# Patient Record
Sex: MALE | Race: WHITE | NOT HISPANIC OR LATINO | Employment: FULL TIME | URBAN - METROPOLITAN AREA
[De-identification: names, ages, dates, MRNs, and addresses within clinical notes are randomized per-mention and may not be internally consistent; named-entity substitution may affect disease eponyms.]

---

## 2018-04-19 ENCOUNTER — HISTORICAL (OUTPATIENT)
Dept: ADMINISTRATIVE | Facility: HOSPITAL | Age: 53
End: 2018-04-19

## 2018-04-19 LAB
ABS NEUT (OLG): 3
ALBUMIN SERPL-MCNC: 4.4 GM/DL (ref 3.4–5)
ALBUMIN/GLOB SERPL: 1.42 {RATIO} (ref 1.5–2.5)
ALP SERPL-CCNC: 55 UNIT/L (ref 38–126)
ALT SERPL-CCNC: 30 UNIT/L (ref 7–52)
APPEARANCE, UA: CLEAR
AST SERPL-CCNC: 27 UNIT/L (ref 15–37)
BACTERIA #/AREA URNS AUTO: NORMAL /HPF
BILIRUB SERPL-MCNC: 0.6 MG/DL (ref 0.2–1)
BILIRUB UR QL STRIP: NEGATIVE MG/DL
BILIRUBIN DIRECT+TOT PNL SERPL-MCNC: 0.1 MG/DL (ref 0–0.5)
BUN SERPL-MCNC: 20 MG/DL (ref 7–18)
CALCIUM SERPL-MCNC: 9 MG/DL (ref 8.5–10)
CHLORIDE SERPL-SCNC: 103 MMOL/L (ref 98–107)
CHOLEST SERPL-MCNC: 194 MG/DL (ref 0–200)
CHOLEST/HDLC SERPL: 4.6 {RATIO}
CO2 SERPL-SCNC: 29 MMOL/L (ref 21–32)
COLOR UR: YELLOW
CREAT SERPL-MCNC: 0.89 MG/DL (ref 0.6–1.3)
CREAT/UREA NIT SERPL: 22.5
ERYTHROCYTE [DISTWIDTH] IN BLOOD BY AUTOMATED COUNT: 13.1 % (ref 11.5–17)
GGT SERPL-CCNC: 29 UNIT/L (ref 5–85)
GLOBULIN SER-MCNC: 3.1 GM/DL (ref 1.2–3)
GLUCOSE (UA): NEGATIVE MG/DL
GLUCOSE SERPL-MCNC: 99 MG/DL (ref 74–106)
HCT VFR BLD AUTO: 43.7 % (ref 42–52)
HDLC SERPL-MCNC: 42 MG/DL (ref 35–60)
HGB BLD-MCNC: 15.2 GM/DL (ref 14–18)
HGB UR QL STRIP: NEGATIVE UNIT/L
KETONES UR QL STRIP: NEGATIVE MG/DL
LDH SERPL-CCNC: 142 UNIT/L (ref 140–271)
LDLC SERPL CALC-MCNC: 115 MG/DL (ref 0–129)
LEUKOCYTE ESTERASE UR QL STRIP: NEGATIVE UNIT/L
LYMPHOCYTES # BLD AUTO: 2 X10(3)/MCL (ref 0.6–3.4)
LYMPHOCYTES NFR BLD AUTO: 35.7 % (ref 13–40)
MCH RBC QN AUTO: 33.6 PG (ref 27–31.2)
MCHC RBC AUTO-ENTMCNC: 35 GM/DL (ref 32–36)
MCV RBC AUTO: 96 FL (ref 80–94)
MONOCYTES # BLD AUTO: 0.5 X10(3)/MCL (ref 0–1.8)
MONOCYTES NFR BLD AUTO: 9.5 % (ref 0.1–24)
NEUTROPHILS NFR BLD AUTO: 54.8 % (ref 47–80)
NITRITE UR QL STRIP.AUTO: NEGATIVE
PH UR STRIP: 6 [PH]
PLATELET # BLD AUTO: 207 X10(3)/MCL (ref 130–400)
PMV BLD AUTO: 8.3 FL
POTASSIUM SERPL-SCNC: 4.5 MMOL/L (ref 3.5–5.1)
PROT SERPL-MCNC: 7.5 GM/DL (ref 6.4–8.2)
PROT UR QL STRIP: NEGATIVE MG/DL
PSA SERPL-MCNC: 4.4 NG/ML (ref 0–3.5)
RBC # BLD AUTO: 4.53 X10(6)/MCL (ref 4.7–6.1)
RBC #/AREA URNS HPF: NORMAL /HPF
SODIUM SERPL-SCNC: 136 MMOL/L (ref 136–145)
SP GR UR STRIP: 1.02
SQUAMOUS EPITHELIAL, UA: NORMAL /LPF
TRIGL SERPL-MCNC: 111 MG/DL (ref 30–150)
UROBILINOGEN UR STRIP-ACNC: 0.2 MG/DL
VLDLC SERPL CALC-MCNC: 22.2 MG/DL
WBC # SPEC AUTO: 5.5 X10(3)/MCL (ref 4.5–11.5)
WBC #/AREA URNS AUTO: NORMAL /[HPF]

## 2018-11-08 ENCOUNTER — HISTORICAL (OUTPATIENT)
Dept: ADMINISTRATIVE | Facility: HOSPITAL | Age: 53
End: 2018-11-08

## 2018-11-08 LAB
ABS NEUT (OLG): 2.3 X10(3)/MCL (ref 2.1–9.2)
ALBUMIN SERPL-MCNC: 4.1 GM/DL (ref 3.4–5)
ALBUMIN/GLOB SERPL: 1.64 {RATIO} (ref 1.5–2.5)
ALP SERPL-CCNC: 50 UNIT/L (ref 38–126)
ALT SERPL-CCNC: 19 UNIT/L (ref 7–52)
AST SERPL-CCNC: 20 UNIT/L (ref 15–37)
BILIRUB SERPL-MCNC: 0.3 MG/DL (ref 0.2–1)
BILIRUBIN DIRECT+TOT PNL SERPL-MCNC: 0 MG/DL (ref 0–0.5)
BILIRUBIN DIRECT+TOT PNL SERPL-MCNC: 0.3 MG/DL
BUN SERPL-MCNC: 15 MG/DL (ref 7–18)
CALCIUM SERPL-MCNC: 8.6 MG/DL (ref 8.5–10)
CHLORIDE SERPL-SCNC: 105 MMOL/L (ref 98–107)
CO2 SERPL-SCNC: 29 MMOL/L (ref 21–32)
CREAT SERPL-MCNC: 0.95 MG/DL (ref 0.6–1.3)
ERYTHROCYTE [DISTWIDTH] IN BLOOD BY AUTOMATED COUNT: 12.6 % (ref 11.5–17)
GLOBULIN SER-MCNC: 2.5 GM/DL (ref 1.2–3)
GLUCOSE SERPL-MCNC: 111 MG/DL (ref 74–106)
HCT VFR BLD AUTO: 40.2 % (ref 42–52)
HGB BLD-MCNC: 13 GM/DL (ref 14–18)
LYMPHOCYTES # BLD AUTO: 2 X10(3)/MCL (ref 0.6–3.4)
LYMPHOCYTES NFR BLD AUTO: 42.6 % (ref 13–40)
MCH RBC QN AUTO: 31.9 PG (ref 27–31.2)
MCHC RBC AUTO-ENTMCNC: 32 GM/DL (ref 32–36)
MCV RBC AUTO: 99 FL (ref 80–94)
MONOCYTES # BLD AUTO: 0.5 X10(3)/MCL (ref 0.1–1.3)
MONOCYTES NFR BLD AUTO: 11.4 % (ref 0.1–24)
NEUTROPHILS NFR BLD AUTO: 46 % (ref 47–80)
PLATELET # BLD AUTO: 209 X10(3)/MCL (ref 130–400)
PMV BLD AUTO: 8.4 FL (ref 9.4–12.4)
POTASSIUM SERPL-SCNC: 4 MMOL/L (ref 3.5–5.1)
PROT SERPL-MCNC: 6.6 GM/DL (ref 6.4–8.2)
PSA SERPL-MCNC: 3.91 NG/ML (ref 0–3.5)
RBC # BLD AUTO: 4.07 X10(6)/MCL (ref 4.7–6.1)
SODIUM SERPL-SCNC: 139 MMOL/L (ref 136–145)
TSH SERPL-ACNC: 1.94 MIU/ML (ref 0.35–4.94)
WBC # SPEC AUTO: 4.8 X10(3)/MCL (ref 4.5–11.5)

## 2018-11-15 ENCOUNTER — HISTORICAL (OUTPATIENT)
Dept: ADMINISTRATIVE | Facility: HOSPITAL | Age: 53
End: 2018-11-15

## 2018-11-15 LAB
PSA SERPL-MCNC: 5.75 NG/ML (ref 0–3.5)
TESTOST SERPL-MCNC: 554 NG/DL (ref 300–1060)

## 2019-05-30 ENCOUNTER — HISTORICAL (OUTPATIENT)
Dept: ADMINISTRATIVE | Facility: HOSPITAL | Age: 54
End: 2019-05-30

## 2019-05-30 LAB
ABS NEUT (OLG): 2.6 X10(3)/MCL (ref 2.1–9.2)
ALBUMIN SERPL-MCNC: 4.2 GM/DL (ref 3.4–5)
ALBUMIN/GLOB SERPL: 1.75 {RATIO} (ref 1.5–2.5)
ALP SERPL-CCNC: 46 UNIT/L (ref 38–126)
ALT SERPL-CCNC: 19 UNIT/L (ref 7–52)
APPEARANCE, UA: CLEAR
AST SERPL-CCNC: 20 UNIT/L (ref 15–37)
BACTERIA #/AREA URNS AUTO: NORMAL /HPF
BILIRUB SERPL-MCNC: 0.6 MG/DL (ref 0.2–1)
BILIRUB UR QL STRIP: NEGATIVE MG/DL
BILIRUBIN DIRECT+TOT PNL SERPL-MCNC: 0.2 MG/DL (ref 0–0.5)
BILIRUBIN DIRECT+TOT PNL SERPL-MCNC: 0.4 MG/DL
BUN SERPL-MCNC: 22 MG/DL (ref 7–18)
CALCIUM SERPL-MCNC: 8.6 MG/DL (ref 8.5–10)
CHLORIDE SERPL-SCNC: 105 MMOL/L (ref 98–107)
CHOLEST SERPL-MCNC: 183 MG/DL (ref 0–200)
CHOLEST/HDLC SERPL: 4.2 {RATIO}
CO2 SERPL-SCNC: 27 MMOL/L (ref 21–32)
COLOR UR: NORMAL
CREAT SERPL-MCNC: 0.88 MG/DL (ref 0.6–1.3)
ERYTHROCYTE [DISTWIDTH] IN BLOOD BY AUTOMATED COUNT: 12.5 % (ref 11.5–17)
EST. AVERAGE GLUCOSE BLD GHB EST-MCNC: 91 MG/DL
GLOBULIN SER-MCNC: 2.4 GM/DL (ref 1.2–3)
GLUCOSE (UA): NEGATIVE MG/DL
GLUCOSE SERPL-MCNC: 112 MG/DL (ref 74–106)
HBA1C MFR BLD: 4.8 % (ref 4.4–6.4)
HCT VFR BLD AUTO: 41.7 % (ref 42–52)
HDLC SERPL-MCNC: 44 MG/DL (ref 35–60)
HGB BLD-MCNC: 14.5 GM/DL (ref 14–18)
HGB UR QL STRIP: NEGATIVE UNIT/L
KETONES UR QL STRIP: NEGATIVE MG/DL
LDLC SERPL CALC-MCNC: 108 MG/DL (ref 0–129)
LEUKOCYTE ESTERASE UR QL STRIP: NEGATIVE UNIT/L
LYMPHOCYTES # BLD AUTO: 1.5 X10(3)/MCL (ref 0.6–3.4)
LYMPHOCYTES NFR BLD AUTO: 33.4 % (ref 13–40)
MCH RBC QN AUTO: 32.4 PG (ref 27–31.2)
MCHC RBC AUTO-ENTMCNC: 35 GM/DL (ref 32–36)
MCV RBC AUTO: 93 FL (ref 80–94)
MONOCYTES # BLD AUTO: 0.5 X10(3)/MCL (ref 0.1–1.3)
MONOCYTES NFR BLD AUTO: 11.6 % (ref 0.1–24)
NEUTROPHILS NFR BLD AUTO: 55 % (ref 47–80)
NITRITE UR QL STRIP.AUTO: NEGATIVE
PH UR STRIP: 5.5 [PH]
PLATELET # BLD AUTO: 190 X10(3)/MCL (ref 130–400)
PMV BLD AUTO: 8.6 FL (ref 9.4–12.4)
POTASSIUM SERPL-SCNC: 4.2 MMOL/L (ref 3.5–5.1)
PROT SERPL-MCNC: 6.6 GM/DL (ref 6.4–8.2)
PROT UR QL STRIP: NEGATIVE MG/DL
PSA SERPL-MCNC: 5.72 NG/ML (ref 0–3.5)
RBC # BLD AUTO: 4.47 X10(6)/MCL (ref 4.7–6.1)
RBC #/AREA URNS HPF: NORMAL /HPF
SODIUM SERPL-SCNC: 138 MMOL/L (ref 136–145)
SP GR UR STRIP: 1.02
SQUAMOUS EPITHELIAL, UA: NORMAL /LPF
TRIGL SERPL-MCNC: 94 MG/DL (ref 30–150)
TSH SERPL-ACNC: 1.32 MIU/ML (ref 0.35–4.94)
UROBILINOGEN UR STRIP-ACNC: 0.2 MG/DL
VLDLC SERPL CALC-MCNC: 18.8 MG/DL
WBC # SPEC AUTO: 4.6 X10(3)/MCL (ref 4.5–11.5)
WBC #/AREA URNS AUTO: NORMAL /[HPF]

## 2020-06-29 ENCOUNTER — HISTORICAL (OUTPATIENT)
Dept: ADMINISTRATIVE | Facility: HOSPITAL | Age: 55
End: 2020-06-29

## 2020-06-29 LAB
ABS NEUT (OLG): 3.1 X10(3)/MCL (ref 2.1–9.2)
ALBUMIN SERPL-MCNC: 4.3 GM/DL (ref 3.4–5)
ALBUMIN/GLOB SERPL: 1.65 {RATIO} (ref 1.5–2.5)
ALP SERPL-CCNC: 46 UNIT/L (ref 38–126)
ALT SERPL-CCNC: 18 UNIT/L (ref 7–52)
APPEARANCE, UA: CLEAR
AST SERPL-CCNC: 20 UNIT/L (ref 15–37)
BACTERIA #/AREA URNS AUTO: NORMAL /HPF
BILIRUB SERPL-MCNC: 0.6 MG/DL (ref 0.2–1)
BILIRUB UR QL STRIP: NEGATIVE MG/DL
BILIRUBIN DIRECT+TOT PNL SERPL-MCNC: 0.1 MG/DL (ref 0–0.5)
BILIRUBIN DIRECT+TOT PNL SERPL-MCNC: 0.5 MG/DL
BUN SERPL-MCNC: 18 MG/DL (ref 7–18)
CALCIUM SERPL-MCNC: 9.3 MG/DL (ref 8.5–10)
CHLORIDE SERPL-SCNC: 103 MMOL/L (ref 98–107)
CHOLEST SERPL-MCNC: 178 MG/DL (ref 0–200)
CHOLEST/HDLC SERPL: 4 {RATIO}
CO2 SERPL-SCNC: 30 MMOL/L (ref 21–32)
COLOR UR: YELLOW
CREAT SERPL-MCNC: 0.92 MG/DL (ref 0.6–1.3)
ERYTHROCYTE [DISTWIDTH] IN BLOOD BY AUTOMATED COUNT: 12.8 % (ref 11.5–17)
EST. AVERAGE GLUCOSE BLD GHB EST-MCNC: 100 MG/DL
GLOBULIN SER-MCNC: 2.6 GM/DL (ref 1.2–3)
GLUCOSE (UA): NEGATIVE MG/DL
GLUCOSE SERPL-MCNC: 98 MG/DL (ref 74–106)
HBA1C MFR BLD: 5.1 % (ref 4.4–6.4)
HCT VFR BLD AUTO: 42.5 % (ref 42–52)
HDLC SERPL-MCNC: 44 MG/DL (ref 35–60)
HGB BLD-MCNC: 14.3 GM/DL (ref 14–18)
HGB UR QL STRIP: NEGATIVE UNIT/L
KETONES UR QL STRIP: NEGATIVE MG/DL
LDLC SERPL CALC-MCNC: 95 MG/DL (ref 0–129)
LEUKOCYTE ESTERASE UR QL STRIP: NEGATIVE UNIT/L
LYMPHOCYTES # BLD AUTO: 2.1 X10(3)/MCL (ref 0.6–3.4)
LYMPHOCYTES NFR BLD AUTO: 37.9 % (ref 13–40)
MCH RBC QN AUTO: 31.2 PG (ref 27–31.2)
MCHC RBC AUTO-ENTMCNC: 34 GM/DL (ref 32–36)
MCV RBC AUTO: 93 FL (ref 80–94)
MONOCYTES # BLD AUTO: 0.4 X10(3)/MCL (ref 0.1–1.3)
MONOCYTES NFR BLD AUTO: 7.5 % (ref 0.1–24)
NEUTROPHILS NFR BLD AUTO: 54.6 % (ref 47–80)
NITRITE UR QL STRIP.AUTO: NEGATIVE
PH UR STRIP: 7 [PH]
PLATELET # BLD AUTO: 228 X10(3)/MCL (ref 130–400)
PMV BLD AUTO: 8.5 FL (ref 9.4–12.4)
POTASSIUM SERPL-SCNC: 4.3 MMOL/L (ref 3.5–5.1)
PROT SERPL-MCNC: 6.9 GM/DL (ref 6.4–8.2)
PROT UR QL STRIP: NEGATIVE MG/DL
PSA SERPL-MCNC: 5.71 NG/ML (ref 0–3.5)
RBC # BLD AUTO: 4.59 X10(6)/MCL (ref 4.7–6.1)
RBC #/AREA URNS HPF: NORMAL /HPF
SODIUM SERPL-SCNC: 140 MMOL/L (ref 136–145)
SP GR UR STRIP: 1.02
SQUAMOUS EPITHELIAL, UA: NORMAL /LPF
TRIGL SERPL-MCNC: 205 MG/DL (ref 30–150)
TSH SERPL-ACNC: 2.3 MIU/ML (ref 0.35–4.94)
UROBILINOGEN UR STRIP-ACNC: 0.2 MG/DL
VLDLC SERPL CALC-MCNC: 41 MG/DL
WBC # SPEC AUTO: 5.6 X10(3)/MCL (ref 4.5–11.5)
WBC #/AREA URNS AUTO: NORMAL /[HPF]

## 2021-01-05 ENCOUNTER — HISTORICAL (OUTPATIENT)
Dept: ADMINISTRATIVE | Facility: HOSPITAL | Age: 56
End: 2021-01-05

## 2021-01-05 LAB
ALBUMIN SERPL-MCNC: 4.4 GM/DL (ref 3.4–5)
ALBUMIN/GLOB SERPL: 1.52 {RATIO} (ref 1.5–2.5)
ALP SERPL-CCNC: 51 UNIT/L (ref 38–126)
ALT SERPL-CCNC: 31 UNIT/L (ref 7–52)
AST SERPL-CCNC: 27 UNIT/L (ref 15–37)
BILIRUB SERPL-MCNC: 0.6 MG/DL (ref 0.2–1)
BILIRUBIN DIRECT+TOT PNL SERPL-MCNC: 0.2 MG/DL (ref 0–0.5)
BILIRUBIN DIRECT+TOT PNL SERPL-MCNC: 0.4 MG/DL
BUN SERPL-MCNC: 19 MG/DL (ref 7–18)
CALCIUM SERPL-MCNC: 9.2 MG/DL (ref 8.5–10.1)
CHLORIDE SERPL-SCNC: 102 MMOL/L (ref 98–107)
CHOLEST SERPL-MCNC: 182 MG/DL (ref 0–200)
CHOLEST/HDLC SERPL: 4.1 {RATIO}
CO2 SERPL-SCNC: 26 MMOL/L (ref 21–32)
CREAT SERPL-MCNC: 0.83 MG/DL (ref 0.6–1.3)
GLOBULIN SER-MCNC: 2.9 GM/DL (ref 1.2–3)
GLUCOSE SERPL-MCNC: 102 MG/DL (ref 74–106)
HDLC SERPL-MCNC: 44 MG/DL (ref 35–60)
LDLC SERPL CALC-MCNC: 104 MG/DL (ref 0–129)
POTASSIUM SERPL-SCNC: 4 MMOL/L (ref 3.5–5.1)
PROT SERPL-MCNC: 7.3 GM/DL (ref 6.4–8.2)
SODIUM SERPL-SCNC: 137 MMOL/L (ref 136–145)
TRIGL SERPL-MCNC: 174 MG/DL (ref 30–150)
VLDLC SERPL CALC-MCNC: 34.8 MG/DL

## 2021-01-06 ENCOUNTER — HISTORICAL (OUTPATIENT)
Dept: ADMINISTRATIVE | Facility: HOSPITAL | Age: 56
End: 2021-01-06

## 2021-01-06 LAB
FLUAV AG NPH QL IA: NEGATIVE
FLUBV AG NPH QL IA: NEGATIVE

## 2021-07-09 ENCOUNTER — HISTORICAL (OUTPATIENT)
Dept: ADMINISTRATIVE | Facility: HOSPITAL | Age: 56
End: 2021-07-09

## 2021-07-09 LAB
ABS NEUT (OLG): 2.5 X10(3)/MCL (ref 2.1–9.2)
ALBUMIN SERPL-MCNC: 4.4 GM/DL (ref 3.4–5)
ALBUMIN/GLOB SERPL: 1.57 {RATIO} (ref 1.5–2.5)
ALP SERPL-CCNC: 52 UNIT/L (ref 38–126)
ALT SERPL-CCNC: 20 UNIT/L (ref 7–52)
APPEARANCE, UA: CLEAR
AST SERPL-CCNC: 20 UNIT/L (ref 15–37)
BACTERIA #/AREA URNS AUTO: NORMAL /HPF
BILIRUB SERPL-MCNC: 0.5 MG/DL (ref 0.2–1)
BILIRUB UR QL STRIP: NEGATIVE MG/DL
BILIRUBIN DIRECT+TOT PNL SERPL-MCNC: 0.1 MG/DL (ref 0–0.5)
BILIRUBIN DIRECT+TOT PNL SERPL-MCNC: 0.4 MG/DL
BUN SERPL-MCNC: 18 MG/DL (ref 7–18)
CALCIUM SERPL-MCNC: 8.9 MG/DL (ref 8.5–10.1)
CHLORIDE SERPL-SCNC: 105 MMOL/L (ref 98–107)
CHOLEST SERPL-MCNC: 175 MG/DL (ref 0–200)
CHOLEST/HDLC SERPL: 3.8 {RATIO}
CO2 SERPL-SCNC: 27 MMOL/L (ref 21–32)
COLOR UR: YELLOW
CREAT SERPL-MCNC: 0.87 MG/DL (ref 0.6–1.3)
ERYTHROCYTE [DISTWIDTH] IN BLOOD BY AUTOMATED COUNT: 12.3 % (ref 11.5–17)
EST. AVERAGE GLUCOSE BLD GHB EST-MCNC: 88 MG/DL
GLOBULIN SER-MCNC: 2.8 GM/DL (ref 1.2–3)
GLUCOSE (UA): NEGATIVE MG/DL
GLUCOSE SERPL-MCNC: 103 MG/DL (ref 74–106)
HBA1C MFR BLD: 4.7 % (ref 4.4–6.4)
HCT VFR BLD AUTO: 41.3 % (ref 42–52)
HDLC SERPL-MCNC: 46 MG/DL (ref 35–60)
HGB BLD-MCNC: 13.6 GM/DL (ref 14–18)
HGB UR QL STRIP: NEGATIVE UNIT/L
KETONES UR QL STRIP: NEGATIVE MG/DL
LDLC SERPL CALC-MCNC: 107 MG/DL (ref 0–129)
LEUKOCYTE ESTERASE UR QL STRIP: NEGATIVE UNIT/L
LYMPHOCYTES # BLD AUTO: 1.7 X10(3)/MCL (ref 0.6–3.4)
LYMPHOCYTES NFR BLD AUTO: 37.1 % (ref 13–40)
MCH RBC QN AUTO: 31.1 PG (ref 27–31.2)
MCHC RBC AUTO-ENTMCNC: 33 GM/DL (ref 32–36)
MCV RBC AUTO: 94 FL (ref 80–94)
MONOCYTES # BLD AUTO: 0.4 X10(3)/MCL (ref 0.1–1.3)
MONOCYTES NFR BLD AUTO: 8.3 % (ref 0.1–24)
NEUTROPHILS NFR BLD AUTO: 54.6 % (ref 47–80)
NITRITE UR QL STRIP.AUTO: NEGATIVE
PH UR STRIP: 6 [PH]
PLATELET # BLD AUTO: 222 X10(3)/MCL (ref 130–400)
PMV BLD AUTO: 9.2 FL (ref 9.4–12.4)
POTASSIUM SERPL-SCNC: 4.3 MMOL/L (ref 3.5–5.1)
PROT SERPL-MCNC: 7.2 GM/DL (ref 6.4–8.2)
PROT UR QL STRIP: NEGATIVE MG/DL
PSA SERPL-MCNC: 7.2 NG/ML (ref 0–3.5)
RBC # BLD AUTO: 4.38 X10(6)/MCL (ref 4.7–6.1)
RBC #/AREA URNS HPF: NORMAL /HPF
SODIUM SERPL-SCNC: 140 MMOL/L (ref 136–145)
SP GR UR STRIP: 1.02
SQUAMOUS EPITHELIAL, UA: NORMAL /LPF
TRIGL SERPL-MCNC: 93 MG/DL (ref 30–150)
UROBILINOGEN UR STRIP-ACNC: 0.2 MG/DL
VLDLC SERPL CALC-MCNC: 18.6 MG/DL
WBC # SPEC AUTO: 4.6 X10(3)/MCL (ref 4.5–11.5)
WBC #/AREA URNS AUTO: NORMAL /[HPF]

## 2022-01-11 ENCOUNTER — HISTORICAL (OUTPATIENT)
Dept: ADMINISTRATIVE | Facility: HOSPITAL | Age: 57
End: 2022-01-11

## 2022-01-11 LAB
ABS NEUT (OLG): 3 X10(3)/MCL (ref 2.1–9.2)
ALBUMIN SERPL-MCNC: 4.5 GM/DL (ref 3.4–5)
ALBUMIN/GLOB SERPL: 1.61 {RATIO} (ref 1.5–2.5)
ALP SERPL-CCNC: 49 UNIT/L (ref 38–126)
ALT SERPL-CCNC: 26 UNIT/L (ref 7–52)
AST SERPL-CCNC: 23 UNIT/L (ref 15–37)
BILIRUB SERPL-MCNC: 0.4 MG/DL (ref 0.2–1)
BILIRUBIN DIRECT+TOT PNL SERPL-MCNC: 0.1 MG/DL (ref 0–0.5)
BILIRUBIN DIRECT+TOT PNL SERPL-MCNC: 0.3 MG/DL
BUN SERPL-MCNC: 18 MG/DL (ref 7–18)
CALCIUM SERPL-MCNC: 9.3 MG/DL (ref 8.5–10.1)
CHLORIDE SERPL-SCNC: 103 MMOL/L (ref 98–107)
CHOLEST SERPL-MCNC: 188 MG/DL (ref 0–200)
CHOLEST/HDLC SERPL: 4.1 {RATIO}
CO2 SERPL-SCNC: 28 MMOL/L (ref 21–32)
CREAT SERPL-MCNC: 0.97 MG/DL (ref 0.6–1.3)
ERYTHROCYTE [DISTWIDTH] IN BLOOD BY AUTOMATED COUNT: 12.3 % (ref 11.5–17)
GLOBULIN SER-MCNC: 2.8 GM/DL (ref 1.2–3)
GLUCOSE SERPL-MCNC: 104 MG/DL (ref 74–106)
HCT VFR BLD AUTO: 41.9 % (ref 42–52)
HDLC SERPL-MCNC: 46 MG/DL (ref 35–60)
HGB BLD-MCNC: 14.3 GM/DL (ref 14–18)
LDLC SERPL CALC-MCNC: 114 MG/DL (ref 0–129)
LYMPHOCYTES # BLD AUTO: 2 X10(3)/MCL (ref 0.6–3.4)
LYMPHOCYTES NFR BLD AUTO: 36 % (ref 13–40)
MCH RBC QN AUTO: 31.4 PG (ref 27–31.2)
MCHC RBC AUTO-ENTMCNC: 34 GM/DL (ref 32–36)
MCV RBC AUTO: 92 FL (ref 80–94)
MONOCYTES # BLD AUTO: 0.6 X10(3)/MCL (ref 0.1–1.3)
MONOCYTES NFR BLD AUTO: 10.2 % (ref 0.1–24)
NEUTROPHILS NFR BLD AUTO: 53.8 % (ref 47–80)
PLATELET # BLD AUTO: 224 X10(3)/MCL (ref 130–400)
PMV BLD AUTO: 9.1 FL (ref 9.4–12.4)
POTASSIUM SERPL-SCNC: 4.5 MMOL/L (ref 3.5–5.1)
PROT SERPL-MCNC: 7.3 GM/DL (ref 6.4–8.2)
RBC # BLD AUTO: 4.55 X10(6)/MCL (ref 4.7–6.1)
SODIUM SERPL-SCNC: 138 MMOL/L (ref 136–145)
TRIGL SERPL-MCNC: 183 MG/DL (ref 30–150)
VLDLC SERPL CALC-MCNC: 36.6 MG/DL
WBC # SPEC AUTO: 5.6 X10(3)/MCL (ref 4.5–11.5)

## 2022-01-12 ENCOUNTER — HISTORICAL (OUTPATIENT)
Dept: ADMINISTRATIVE | Facility: HOSPITAL | Age: 57
End: 2022-01-12

## 2022-01-12 LAB
DEPRECATED CALCIDIOL+CALCIFEROL SERPL-MC: 39 NG/ML (ref 30–80)
EST. AVERAGE GLUCOSE BLD GHB EST-MCNC: 91 MG/DL
HBA1C MFR BLD: 4.8 % (ref 4.4–6.4)
TESTOST SERPL-MCNC: 680 NG/DL (ref 300–1060)
TSH SERPL-ACNC: 2.23 MIU/ML (ref 0.35–4.94)

## 2022-04-09 ENCOUNTER — HISTORICAL (OUTPATIENT)
Dept: ADMINISTRATIVE | Facility: HOSPITAL | Age: 57
End: 2022-04-09

## 2022-04-29 VITALS
HEIGHT: 70 IN | WEIGHT: 209.88 LBS | DIASTOLIC BLOOD PRESSURE: 88 MMHG | BODY MASS INDEX: 30.05 KG/M2 | SYSTOLIC BLOOD PRESSURE: 139 MMHG

## 2022-05-02 NOTE — HISTORICAL [facility]
This is a historical note converted from [place]. Formatting and pictures may have been removed.  Please reference [place] for original formatting and attached multimedia. Chief Complaint  6 MTH RECHECK  History of Present Illness  Patient presents for a 6-month?follow-up?visit.?Labs that were done on?1/11/2022?reviewed with patient at time of office visit.  Denies change in?social history. No exercise.?No nicotine.  Declines Flu vaccine. Had COVID infection in 2021, declines vaccine.  Review of Systems  General:??????????????? Fatigue.  HEENT:?????????????????? Denies vision changes or eye pain.? No sore throat, ear pain, sinus pressure or discharge.  Cardiovascular:??? Denies chest pain, palpitations, dyspnea on exertion, orthopnea.  Respiratory:???????? No cough, wheezing, shortness of breath, or sputum.  GI:????????????????????????? Denies nausea, emesis, constipation, diarrhea, melena, hematochezia or abdominal pain  :??????????????????????? Decreased libido, desires testosterone level.? No frequency, urgency, hematuria, discharge, or incontinence  MS:?????????????????????? Denies myalgias, arthralgias, joint effusion, edema, or weakness  Neuro:????????????????? No headaches, numbness in extremities, tingling, dizziness, or weakness  Psych:?????????????????? Mood is well controlled.?Denies? suicidal or homicidal ideations, or irritability  Endo:??????????????????? Denies polyuria, polydipsia, polyphagia  Heme:????????????????? No abnormal bleeding or bruising. No lymph node enlargement or pain.  Physical Exam  Vitals & Measurements  HR:?68(Peripheral)? BP:?139/88?  HT:?177.00?cm? HT:?177?cm? WT:?95.200?kg? WT:?95.2?kg? BMI:?30.39?  General :?????Well-developed, mildly obese white male in no apparent distress, alert and oriented ?4  HEENT:????? TMs and EACs within normal limits,?nasal mucosa within normal limits?with no discharge,?oropharynx clear  Integument :????? Skin is intact with no erythema.? No pustules  or vesicles.? No rash or scale. No Lymphadenopathy.? No urticaria.? No abnormal nevi  Neck :?????Supple, no lymphadenopathy, no thyromegaly, no bruits, no jugular venous distention  Cardiovascular :?????? Regular rhythm and rate, no murmurs, radial and dorsal pedal pulses 2+ bilaterally  Respiratory :??????Lungs clear to auscultation bilaterally, no wheezes, no crackles, no rhonchi.? Good air movement  Abdomen :?????? ?NABS, soft, nontender, no hepatosplenomegaly, no masses, no guarding or rebound????????????????????????  Ext:??????? No muscle tenderness, joints WNL, FROM, negative SLR, no?CCE  Neuro :???????? grossly intact  Heme :?????? No bruising or petechiae?  Back:??????? no CVAT  Assessment/Plan  1.?Depression?F32.9  ?Mood is well controlled. Refilled?fluoxetine 40 mg for 6 months.  Ordered:  Clinic Follow up, *Est. 07/12/22 3:00:00 CDT, CPX in 6 months, CPX in 6 months, Order for future visit, Depression  Anemia  GERD (gastroesophageal reflux disease)  Hypertriglyceridemia, HLink AFP  Office/Outpatient Visit Level 5 Established 51743 PC, Depression  Anemia  GERD (gastroesophageal reflux disease)  Hypertriglyceridemia  Elevated PSA  Decreased libido  Fatigue  Obesity  Erectile dysfunction  Elevated glucose, HLINK AMB - AFP, 01/12/22 8:29:00 CST  ?  2.?Anemia?D64.9  ?His anemia is currently resolved.  Ordered:  Clinic Follow up, *Est. 07/12/22 3:00:00 CDT, CPX in 6 months, CPX in 6 months, Order for future visit, Depression  Anemia  GERD (gastroesophageal reflux disease)  Hypertriglyceridemia, HLink AFP  Office/Outpatient Visit Level 5 Established 68776 PC, Depression  Anemia  GERD (gastroesophageal reflux disease)  Hypertriglyceridemia  Elevated PSA  Decreased libido  Fatigue  Obesity  Erectile dysfunction  Elevated glucose, [facility]LINK AMB - AFP, 01/12/22 8:29:00 CST  ?  3.?GERD (gastroesophageal reflux disease)?K21.9  ?GERD symptoms are well controlled with omeprazole 40 mg, refill for 6  months.  Ordered:  Clinic Follow up, *Est. 07/12/22 3:00:00 CDT, CPX in 6 months, CPX in 6 months, Order for future visit, Depression  Anemia  GERD (gastroesophageal reflux disease)  Hypertriglyceridemia, HLink AFP  Office/Outpatient Visit Level 5 Established 88813 PC, Depression  Anemia  GERD (gastroesophageal reflux disease)  Hypertriglyceridemia  Elevated PSA  Decreased libido  Fatigue  Obesity  Erectile dysfunction  Elevated glucose, HLINK AMB - AFP, 01/12/22 8:29:00 CST  ?  4.?Hypertriglyceridemia?E78.1  ?His triglycerides are mildly elevated?at this time. He was encouraged to?decrease carbohydrate intake,?increase omega-3 supplementation,?and increase exercise.  Ordered:  Clinic Follow up, *Est. 07/12/22 3:00:00 CDT, CPX in 6 months, CPX in 6 months, Order for future visit, Depression  Anemia  GERD (gastroesophageal reflux disease)  Hypertriglyceridemia, HLink AFP  Office/Outpatient Visit Level 5 Established 51025 PC, Depression  Anemia  GERD (gastroesophageal reflux disease)  Hypertriglyceridemia  Elevated PSA  Decreased libido  Fatigue  Obesity  Erectile dysfunction  Elevated glucose, HLINK AMB - AFP, 01/12/22 8:29:00 CST  ?  5.?Elevated PSA?R97.20  ?We will check PSA today and forward to his?urologist, Dr. Morales.  Ordered:  Lab Collection Request, 01/12/22 8:20:00 CST, HLINK AMB - AFP, 01/12/22 8:20:00 CST, Elevated PSA  Office/Outpatient Visit Level 5 Established 58996 PC, Depression  Anemia  GERD (gastroesophageal reflux disease)  Hypertriglyceridemia  Elevated PSA  Decreased libido  Fatigue  Obesity  Erectile dysfunction  Elevated glucose, HLINK AMB - AFP, 01/12/22 8:29:00 CST  Prostate Specific Antigen, Routine collect, 01/12/22 8:20:00 CST, Blood, Order for future visit, Stop date 01/12/22 8:20:00 CST, Lab Collect, Elevated PSA, 01/12/22 8:20:00 CST  Prostate Specific Antigen, Routine collect, 01/11/22 9:15:00 CST, Blood, Stop date 01/11/22 9:15:00 CST, Lab Collect,  Elevated PSA, 01/11/22 9:15:00 CST  ?  6.?Decreased libido?R68.82  ?Per request we will check a testosterone level.  Ordered:  Lab Collection Request, 01/12/22 8:22:00 CST, HLINK AMB - AFP, 01/12/22 8:22:00 CST, Decreased libido  Office/Outpatient Visit Level 5 Established 93768 PC, Depression  Anemia  GERD (gastroesophageal reflux disease)  Hypertriglyceridemia  Elevated PSA  Decreased libido  Fatigue  Obesity  Erectile dysfunction  Elevated glucose, HLINK AMB - AFP, 01/12/22 8:29:00 CST  Testosterone Level Total, Routine collect, 01/12/22 8:22:00 CST, Blood, Order for future visit, Stop date 01/12/22 8:22:00 CST, Lab Collect, Decreased libido, 01/12/22 8:22:00 CST  ?  7.?Fatigue?R53.83  ?We will add TSH and?vitamin D level?to rule out?their contribution to fatigue.  Ordered:  Hemoglobin A1c, Routine collect, 01/12/22 8:24:00 CST, Blood, Order for future visit, Stop date 01/12/22 8:24:00 CST, Lab Collect, Elevated glucose  Fatigue, 01/12/22 8:24:00 CST  Office/Outpatient Visit Level 5 Established 47229 PC, Depression  Anemia  GERD (gastroesophageal reflux disease)  Hypertriglyceridemia  Elevated PSA  Decreased libido  Fatigue  Obesity  Erectile dysfunction  Elevated glucose, HLINK AMB - AFP, 01/12/22 8:29:00 CST  Thyroid Stimulating Hormone, Routine collect, 01/12/22 8:23:00 CST, Blood, Order for future visit, Stop date 01/12/22 8:23:00 CST, Lab Collect, Fatigue, 01/12/22 8:23:00 CST  Vitamin D, 25-Hydroxy Level, Routine collect, 01/12/22 8:23:00 CST, Blood, Order for future visit, Stop date 01/12/22 8:23:00 CST, Lab Collect, Obesity  Fatigue, 01/12/22 8:23:00 CST  ?  8.?Obesity?E66.9  ?Discussed long term adverse effects of obesity. Encouraged to lose weight.?Encouraged to begin regular exercise.  Ordered:  Office/Outpatient Visit Level 5 Established 26636 PC, Depression  Anemia  GERD (gastroesophageal reflux disease)  Hypertriglyceridemia  Elevated PSA  Decreased libido  Fatigue   Obesity  Erectile dysfunction  Elevated glucose, HLINK AMB - AFP, 01/12/22 8:29:00 CST  Vitamin D, 25-Hydroxy Level, Routine collect, 01/12/22 8:23:00 CST, Blood, Order for future visit, Stop date 01/12/22 8:23:00 CST, Lab Collect, Obesity  Fatigue, 01/12/22 8:23:00 CST  ?  9.?Erectile dysfunction?N52.9  ?Sildenafil 100 mg has been effective?most of the time, refill.  Ordered:  Office/Outpatient Visit Level 5 Established 82772 PC, Depression  Anemia  GERD (gastroesophageal reflux disease)  Hypertriglyceridemia  Elevated PSA  Decreased libido  Fatigue  Obesity  Erectile dysfunction  Elevated glucose, HLINK AMB - AFP, 01/12/22 8:29:00 CST  ?  10.?Elevated glucose?R73.09  ?We will check an A1c. See #4.  Ordered:  Hemoglobin A1c, Routine collect, 01/12/22 8:24:00 CST, Blood, Order for future visit, Stop date 01/12/22 8:24:00 CST, Lab Collect, Elevated glucose  Fatigue, 01/12/22 8:24:00 CST  Office/Outpatient Visit Level 5 Established 32826 PC, Depression  Anemia  GERD (gastroesophageal reflux disease)  Hypertriglyceridemia  Elevated PSA  Decreased libido  Fatigue  Obesity  Erectile dysfunction  Elevated glucose, HLINK AMB - AFP, 01/12/22 8:29:00 CST  ?  Orders:  FLUoxetine, 40 mg = 1 cap(s), Oral, Daily, # 90 cap(s), 1 Refill(s), Pharmacy: [id number], 177, cm, Height/Length Dosing, 01/12/22 7:59:00 CST, 95.2, kg, Weight Dosing, 01/12/22 7:59:00 CST  omeprazole, See Instructions, TAKE 1 CAPSULE BY MOUTH ONCE DAILY BEFORE A MEAL, # 90 cap(s), 1 Refill(s), Pharmacy: [facility], 177, cm, Height/Length Dosing, 01/12/22 7:59:00 CST, 95.2, kg, Weight Dosing, 01/12/22 7:59:00 CST  sildenafil, 100 mg = 1 tab(s), Oral, As Directed, # 6 tab(s), 99 Refill(s), Pharmacy: [facility], 177, cm, Height/Length Dosing, 01/12/22 7:59:00 CST, 95.2, kg, Weight Dosing, 01/12/22 7:59:00 CST  Referrals  Clinic Follow up, *Est. 07/12/22 3:00:00 CDT, CPX in 6 months, CPX in 6 months, Order for  future visit, Depression  Anemia  GERD (gastroesophageal reflux disease)  Hypertriglyceridemia, HLink AFP  Clinic Follow up, Order for future visit   Problem List/Past Medical History  Ongoing  Activity tolerance  Anemia  Depression  Elevated glucose  Elevated PSA  Erectile dysfunction  GERD (gastroesophageal reflux disease)  Hypertriglyceridemia  Obesity  Rectal abscess  Wellness examination  Historical  No qualifying data  Procedure/Surgical History  Drainage of Rectum, Open Approach (12/14/2016)  Exam Under Anesthesia Rectal (None) (12/14/2016)  Incision & Drainage Rectal Abscess (None) (12/14/2016)  Incision and drainage of ischiorectal and/or perirectal abscess (separate procedure) (12/14/2016)  colonoscopy (2009)   Medications  FLUoxetine 40 mg oral capsule, 40 mg= 1 cap(s), Oral, Daily, 1 refills  Multi Vitamin+  omeprazole 40 mg oral DR capsule, See Instructions, 1 refills  sildenafil 100 mg oral tablet, 100 mg= 1 tab(s), Oral, As Directed, 99 refills  Allergies  No Known Allergies  Social History  Abuse/Neglect  No, 01/12/2022  No, 07/12/2021  No, 01/06/2021  No, 07/01/2020  Alcohol  Current, Beer, 1-2 times per week, 12/13/2016  Employment/School  Employed, Work/School description: [employer] 06/06/2019  Tobacco  Never (less than 100 in lifetime), No, 01/12/2022  Never (less than 100 in lifetime), No, 07/12/2021  Never (less than 100 in lifetime), No, 01/06/2021  Never (less than 100 in lifetime), No, 01/06/2021  Never (less than 100 in lifetime), No, 07/01/2020  Never (less than 100 in lifetime), N/A, 06/06/2019  Never smoker, N/A, 11/15/2018  Family History  Pancreatic cancer.....: Mother.  Primary malignant neoplasm of prostate: Father.  Health Maintenance  Health Maintenance  ???Pending?(in the next year)  ??? ??OverDue  ??? ? ? ?Influenza Vaccine due??10/01/21??and every 1??day(s)  ??? ??Due?  ??? ? ? ?Alcohol Misuse Screening due??01/02/22??and every 1??year(s)  ??? ? ? ?ADL  Screening due??01/12/22??and every 1??year(s)  ??? ? ? ?Aspirin Therapy for CVD Prevention due??01/12/22??and every 1??year(s)  ??? ? ? ?Colorectal Screening due??01/12/22??Unknown Frequency  ??? ? ? ?Tetanus Vaccine due??01/12/22??and every 10??year(s)  ??? ? ? ?Zoster Vaccine due??01/12/22??Unknown Frequency  ??? ??Due In Future?  ??? ? ? ?Obesity Screening not due until??01/01/23??and every 1??year(s)  ???Satisfied?(in the past 1 year)  ??? ??Satisfied?  ??? ? ? ?Blood Pressure Screening on??01/12/22.??Satisfied by Jessie Le CMA S.  ??? ? ? ?Body Mass Index Check on??01/12/22.??Satisfied by Jessie Le CMA S.  ??? ? ? ?Diabetes Screening on??01/11/22.??Satisfied by Faisal Valladares  ??? ? ? ?Influenza Vaccine on??01/12/22.??Satisfied by Jessie Le CMA S.  ??? ? ? ?Lipid Screening on??01/11/22.??Satisfied by Faisal Valladares  ??? ? ? ?Obesity Screening on??01/12/22.??Satisfied by Jessie Le CMA S.  ?  Lab Results  Test Name Test Result Date/Time   Glucose Lvl 104.0 mg/dL 01/11/2022 09:15 CST   BUN 18.0 mg/dL 01/11/2022 09:15 CST   Creatinine 0.97 mg/dL 01/11/2022 09:15 CST   AST 23.0 unit/L 01/11/2022 09:15 CST   ALT 26.0 unit/L 01/11/2022 09:15 CST   Chol 188.0 mg/dL 01/11/2022 09:15 CST   HDL 46.0 mg/dL 01/11/2022 09:15 CST   Trig 183.0 mg/dL (High) 01/11/2022 09:15 CST   .0 mg/dL 01/11/2022 09:15 CST   Chol/HDL 4.1 01/11/2022 09:15 CST   Hgb 14.3 gm/dL 01/11/2022 09:15 CST   Hct 41.9 % (Low) 01/11/2022 09:15 CST

## 2022-05-02 NOTE — HISTORICAL [facility] CERNER
This is a historical note converted from [place]. Formatting and pictures may have been removed.  Please reference [place] for original formatting and attached multimedia. Chief Complaint  6 MO F/U PT DECLINES FLU VACC  History of Present Illness  Patient here for six-month follow-up.? Denies any side effects to current medications.? Tolerating current meds and?feels that they are effective.? Denies change in social or family history.? Saw [facility] in 2017, bx were normal.  Review of Systems  General:?Omeprazole caps working well for him. Decreased libido .  Donated blood on 11-7-8 for blood drive.  HEENT:?????????????????? Denies vision changes or eye pain.? No sore throat, ear pain, sinus pressure or discharge.  Cardiovascular:??? Denies chest pain, palpitations, dyspnea on exertion, orthopnea.  Respiratory:???????? No cough, wheezing, shortness of breath, or sputum.  GI:????????????????????????? Denies nausea, emesis, constipation, diarrhea, melena, hematochezia or abdominal pain  :??????????????????????? No frequency, urgency, hematuria, discharge, or incontinence  MS:?????????????????????? Denies myalgias, arthralgias, joint effusion, edema, or weakness  Neuro:????????????????? No headaches, numbness in extremities, tingling, dizziness, or weakness  Psych:?????????????????? Denies anxiety, depression, suicidal or homicidal ideations, or irritability  Endo:??????????????????? Denies polyuria, polydipsia, polyphagia  Heme:????????????????? No abnormal bleeding or bruising. No lymph node enlargement or pain.  Physical Exam  Vitals & Measurements  HR:?68(Peripheral)? BP:?128/74?  HT:?177?cm? WT:?92.7?kg?  General :?????Well-developed and? nourished, no apparent distress, alert and oriented ?4  HEENT:????? TMs and EACs within normal limits,?nasal mucosa within normal limits?with no discharge,?oropharynx clear  Integument :????? Skin is intact with no erythema.? No pustules or vesicles.? No rash or scale. No  Lymphadenopathy.? No urticaria.? No abnormal nevi  Neck :?????Supple, no lymphadenopathy, no thyromegaly, no bruits, no jugular venous distention  Cardiovascular :?????? Regular rhythm and rate, no murmurs, radial and dorsal pedal pulses 2+ bilaterally  Respiratory :??????Lungs clear to auscultation bilaterally, no wheezes, no crackles, no rhonchi.? Good air movement  Abdomen :?????? ?NABS, soft, nontender, no hepatosplenomegaly, no masses, no guarding or rebound????????????????????????  MS :??????? No muscle tenderness, joints WNL, FROM, negative SLR, no?CCE  Neuro :? ?????? CN II-XII intact, reflexes 2+ throughout, no motor sensory deficits, negative?cerebellar? tests  Heme :?????? No bruising or petechiae?  Back:??????? no CVAT  Assessment/Plan  1.?Depression?F32.9  ?Controlled on current medication, fluoxetine refilled. ?See #4.  Ordered:  Office/Outpatient Visit Level 4 Established 01961 PC, Depression  Elevated PSA  Acid reflux  Decreased libido, HLINK AMB - AFP, 11/15/18 11:20:00 CST  ?  2.?Elevated PSA?R97.20  ?Level has improved since April. ?Workup in 2017 was benign  Ordered:  Office/Outpatient Visit Level 4 Established 36196 PC, Depression  Elevated PSA  Acid reflux  Decreased libido, HLINK AMB - AFP, 11/15/18 11:20:00 CST  ?  3.?Acid reflux?K21.9  ?Refilled omeprazole for 6 months  Ordered:  Office/Outpatient Visit Level 4 Established 47040 PC, Depression  Elevated PSA  Acid reflux  Decreased libido, HLINK AMB - AFP, 11/15/18 11:20:00 CST  ?  4.?Decreased libido?R68.82  ?If test level is normal, consider changing fluoxetine. ?If his?testosterone level is low?he would prefer to injections versus something oral like Clomid.  Ordered:  Lab Collection Request, 11/15/18 11:16:00 CST, HLINK AMB - AFP, 11/15/18 11:16:00 CST  Office/Outpatient Visit Level 4 Established 34965 PC, Depression  Elevated PSA  Acid reflux  Decreased libido, HLINK AMB - AFP, 11/15/18 11:20:00 CST  Testosterone Level Total,  Routine collect, 11/15/18 11:16:00 CST, Blood, Order for future visit, Stop date 11/15/18 11:16:00 CST, Lab Collect, Decreased libido, 11/15/18 11:16:00 CST  ?  Orders:  FLUoxetine, 40 mg = 1 cap(s), Oral, Daily, # 30 cap(s), 5 Refill(s), Pharmacy: [id number]  omeprazole, 40 mg = 1 cap(s), Oral, Daily, before a meal, # 30 cap(s), 5 Refill(s), Pharmacy: [id number]   Problem List/Past Medical History  Ongoing  Acid reflux  Activity tolerance  Depression  Elevated PSA  Erectile dysfunction  Obesity  Rectal abscess  Wellness examination  Historical  No qualifying data  Procedure/Surgical History  Exam Under Anesthesia Rectal (None) (12/14/2016)  Incision & Drainage Rectal Abscess (None) (12/14/2016)  colonoscopy (2009)   Medications  Cialis 20 mg oral tablet, 20 mg= 1 tab(s), Oral, Daily, 11 refills  FLUoxetine 40 mg oral capsule, 40 mg= 1 cap(s), Oral, Daily, 5 refills  Multi Vitamin+  omeprazole 40 mg oral DR capsule, 40 mg= 1 cap(s), Oral, Daily, 5 refills  Allergies  No Known Allergies  Social History  Alcohol  Current, Beer, 1-2 times per week, 12/13/2016  Tobacco  Never smoker, N/A, 11/15/2018  Family History  Pancreatic cancer 07-JUN-2017 17:55:50<$>: Mother.  Primary malignant neoplasm of prostate: Father.  Health Maintenance  Health Maintenance  ???Pending?(in the next year)  ??? ??OverDue  ??? ? ? ?Diabetes Screening due??and every?  ??? ??Due?  ??? ? ? ?ADL Screening due??11/15/18??and every 1??year(s)  ??? ? ? ?Alcohol Misuse Screening due??11/15/18??and every 1??year(s)  ??? ? ? ?Aspirin Therapy for CVD Prevention due??11/15/18??and every 1??year(s)  ??? ? ? ?Colorectal Screening due??11/15/18??and every?  ??? ? ? ?Influenza Vaccine due??11/15/18??and every?  ??? ? ? ?Tetanus Vaccine due??11/15/18??and every 10??year(s)  ??? ??Due In Future?  ??? ? ? ?Obesity Screening not due until??04/26/19??and every 1??year(s)  ???Satisfied?(in the past 1 year)  ??? ??Satisfied?  ??? ? ? ?Blood  Pressure Screening on??11/15/18.??Satisfied by Sanam Conway  ??? ? ? ?Body Mass Index Check on??04/26/18.??Satisfied by Sanam Conway  ??? ? ? ?Diabetes Screening on??11/08/18.??Satisfied by Faisal Valladares  ??? ? ? ?Influenza Vaccine on??11/15/18.??Satisfied by Sanam Conway  ??? ? ? ?Lipid Screening on??04/19/18.??Satisfied by Viviane Luevano  ??? ? ? ?Obesity Screening on??04/26/18.??Satisfied by Sanam Conway  ?  ?  Lab Results  Test Name Test Result Date/Time Comments   Glucose Lvl 111.0 mg/dL (High) 11/08/2018 07:08 CST    BUN 15.0 mg/dL 11/08/2018 07:08 CST    Creatinine 0.95 mg/dL 11/08/2018 07:08 CST    AST 20.0 unit/L 11/08/2018 07:08 CST    ALT 19.0 unit/L 11/08/2018 07:08 CST    PSA 3.91 ng/mL (High) 11/08/2018 07:08 CST EXPECTED RESULTS:<br/>AGE 40 - 49 . . . . . . . . . . . . . . . . . 0 - 2.5 ng/ml.<br/>AGE 50 - 59 . . . . . . . . . . . . . . . . . 0 - 3.5 ng/ml.<br/>AGE 60 - 69 . . . . . . . . . . . . . . . . . 0 - 4.5 ng/ml.<br/>AGE 70 - 79 . . . . . . . . . . . . . . . . . 0 - 6.5 ng/ml.<br/><br/>The  Total PSA assay is a Chemiluminescent Microparticle Immunoassay (CMIA)   PSA 4.40 ng/mL (High) 04/19/2018 07:49 CDT EXPECTED RESULTS:<br/>AGE 40 - 49 . . . . . . . . . . . . . . . . . 0 - 2.5 ng/ml.<br/>AGE 50 - 59 . . . . . . . . . . . . . . . . . 0 - 3.5 ng/ml.<br/>AGE 60 - 69 . . . . . . . . . . . . . . . . . 0 - 4.5 ng/ml.<br/>AGE 70 - 79 . . . . . . . . . . . . . . . . . 0 - 6.5 ng/ml.   WBC 4.8 x10(3)/mcL 11/08/2018 07:08 CST    Hgb 13.0 gm/dL (Low) 11/08/2018 07:08 CST    Hgb 15.2 gm/dL 04/19/2018 07:49 CDT    Hct 40.2 % (Low) 11/08/2018 07:08 CST    Hct 43.7 % 04/19/2018 07:49 CDT

## 2022-07-18 PROBLEM — R97.20 ELEVATED PSA: Status: ACTIVE | Noted: 2022-07-18

## 2022-07-18 PROBLEM — K63.5 COLON POLYP: Status: ACTIVE | Noted: 2022-07-18

## 2022-07-18 PROBLEM — D64.9 ANEMIA: Status: ACTIVE | Noted: 2022-07-18

## 2022-07-18 PROBLEM — K21.9 GERD (GASTROESOPHAGEAL REFLUX DISEASE): Status: ACTIVE | Noted: 2022-07-18

## 2022-07-18 PROBLEM — F32.A DEPRESSION: Status: ACTIVE | Noted: 2022-07-18

## 2023-03-06 PROBLEM — E78.1 HYPERTRIGLYCERIDEMIA: Status: ACTIVE | Noted: 2023-03-06
